# Patient Record
Sex: FEMALE | Race: WHITE | NOT HISPANIC OR LATINO
[De-identification: names, ages, dates, MRNs, and addresses within clinical notes are randomized per-mention and may not be internally consistent; named-entity substitution may affect disease eponyms.]

---

## 2018-09-14 PROBLEM — Z00.00 ENCOUNTER FOR PREVENTIVE HEALTH EXAMINATION: Status: ACTIVE | Noted: 2018-09-14

## 2018-10-12 ENCOUNTER — APPOINTMENT (OUTPATIENT)
Dept: ORTHOPEDIC SURGERY | Facility: CLINIC | Age: 38
End: 2018-10-12
Payer: MEDICAID

## 2018-10-12 VITALS
DIASTOLIC BLOOD PRESSURE: 74 MMHG | SYSTOLIC BLOOD PRESSURE: 121 MMHG | OXYGEN SATURATION: 98 % | HEART RATE: 81 BPM | HEIGHT: 65 IN | BODY MASS INDEX: 17.99 KG/M2 | WEIGHT: 108 LBS

## 2018-10-12 DIAGNOSIS — M22.2X2 PATELLOFEMORAL DISORDERS, LEFT KNEE: ICD-10-CM

## 2018-10-12 DIAGNOSIS — M76.32 ILIOTIBIAL BAND SYNDROME, LEFT LEG: ICD-10-CM

## 2018-10-12 PROCEDURE — 99203 OFFICE O/P NEW LOW 30 MIN: CPT

## 2019-06-20 ENCOUNTER — APPOINTMENT (OUTPATIENT)
Dept: ENDOCRINOLOGY | Facility: CLINIC | Age: 39
End: 2019-06-20
Payer: MEDICAID

## 2019-06-20 VITALS
DIASTOLIC BLOOD PRESSURE: 80 MMHG | HEART RATE: 78 BPM | BODY MASS INDEX: 19.49 KG/M2 | WEIGHT: 117 LBS | SYSTOLIC BLOOD PRESSURE: 122 MMHG | HEIGHT: 65 IN

## 2019-06-20 DIAGNOSIS — Z82.62 FAMILY HISTORY OF OSTEOPOROSIS: ICD-10-CM

## 2019-06-20 PROCEDURE — 99204 OFFICE O/P NEW MOD 45 MIN: CPT | Mod: 25

## 2019-06-20 PROCEDURE — 36415 COLL VENOUS BLD VENIPUNCTURE: CPT

## 2019-06-21 ENCOUNTER — TRANSCRIPTION ENCOUNTER (OUTPATIENT)
Age: 39
End: 2019-06-21

## 2019-07-01 ENCOUNTER — INBOUND DOCUMENT (OUTPATIENT)
Age: 39
End: 2019-07-01

## 2019-07-02 ENCOUNTER — TRANSCRIPTION ENCOUNTER (OUTPATIENT)
Age: 39
End: 2019-07-02

## 2019-07-02 LAB
ALBUMIN SERPL ELPH-MCNC: 4.3 G/DL
ALP BLD-CCNC: 44 U/L
ALT SERPL-CCNC: 16 U/L
ANION GAP SERPL CALC-SCNC: 12 MMOL/L
AST SERPL-CCNC: 18 U/L
BASOPHILS # BLD AUTO: 0.02 K/UL
BASOPHILS NFR BLD AUTO: 0.3 %
BILIRUB SERPL-MCNC: 0.2 MG/DL
BUN SERPL-MCNC: 13 MG/DL
CALCIUM SERPL-MCNC: 9.4 MG/DL
CHLORIDE SERPL-SCNC: 103 MMOL/L
CO2 SERPL-SCNC: 27 MMOL/L
CREAT SERPL-MCNC: 1.09 MG/DL
DHEA-SULFATE, SERUM: 170 UG/DL
EOSINOPHIL # BLD AUTO: 0.14 K/UL
EOSINOPHIL NFR BLD AUTO: 1.9 %
GLUCOSE SERPL-MCNC: 65 MG/DL
HCT VFR BLD CALC: 42.8 %
HGB BLD-MCNC: 13.7 G/DL
IMM GRANULOCYTES NFR BLD AUTO: 0.3 %
IRON SATN MFR SERPL: 30 %
IRON SERPL-MCNC: 113 UG/DL
LYMPHOCYTES # BLD AUTO: 1.53 K/UL
LYMPHOCYTES NFR BLD AUTO: 21.1 %
MAN DIFF?: NORMAL
MCHC RBC-ENTMCNC: 32 GM/DL
MCHC RBC-ENTMCNC: 32.3 PG
MCV RBC AUTO: 100.9 FL
MONOCYTES # BLD AUTO: 0.67 K/UL
MONOCYTES NFR BLD AUTO: 9.3 %
NEUTROPHILS # BLD AUTO: 4.86 K/UL
NEUTROPHILS NFR BLD AUTO: 67.1 %
PLATELET # BLD AUTO: 355 K/UL
POTASSIUM SERPL-SCNC: 4.5 MMOL/L
PROT SERPL-MCNC: 6.6 G/DL
RBC # BLD: 4.24 M/UL
RBC # FLD: 12.9 %
SODIUM SERPL-SCNC: 142 MMOL/L
T4 FREE SERPL-MCNC: 1.3 NG/DL
TESTOST SERPL-MCNC: 13.2 NG/DL
TIBC SERPL-MCNC: 381 UG/DL
TSH SERPL-ACNC: 1.55 UIU/ML
UIBC SERPL-MCNC: 268 UG/DL
WBC # FLD AUTO: 7.24 K/UL

## 2019-07-02 NOTE — ASSESSMENT
[FreeTextEntry1] : Hair loss/elevated testosterone. She has no evidence of virilization. We will perform a laboratory evaluation as below. We discussed switching her combined oral contraceptive pill to a preparation with norethindrone. We discussed another trial of topical minoxidil. We discussed the limited data for biotin or Nutrafol supplements. We can consider an increase in spironolactone to 100 mg twice daily next visit as needed.\par \par Return to clinic in 6 months or earlier as needed.

## 2019-07-02 NOTE — HISTORY OF PRESENT ILLNESS
[FreeTextEntry1] : Ms. Humphrey is a 39 year-old woman with a history of hair loss, elevated testosterone, mental health issues presenting to establish care with me for her endocrine issues.\par \par Hair loss/elevated testosterone.\par She has a history of hair loss over the past two years. Her testosterone was noted to be elevated around that time.\par She was started on spironolactone in June 2018. Her dose was originally 100 mg twice daily, however, she had very heavy menstrual bleeding and has been cutting her pills in half for a dose of 50 mg twice daily.\par She was started on ethinyl estradiol and levonorgestrel (Lutera) in December 2018.\par She tried topical minoxidil for about a month but had issues with taking it regularly.\par She was previously on lithium, but off for about six months. \par She was having regular menstrual periods before starting a combined oral contraceptive pill. Review of systems otherwise negative in detail.

## 2019-07-02 NOTE — ADDENDUM
[FreeTextEntry1] : Recent test results as below. Iron studies, thyroid function, testosterone, DHEAS are within range. Glucose likely low due to prolonged processing time. Borderline high mean cell volume and can consider vitamin B12/folate testing. Will send a letter to Ms. Humphrey with results. 7/02/19

## 2019-07-02 NOTE — PHYSICAL EXAM
[Alert] : alert [No Acute Distress] : no acute distress [Healthy Appearance] : healthy appearance [No Neck Mass] : no neck mass was observed [Normal Oropharynx] : the oropharynx was normal [Normal Sclera/Conjunctiva] : normal sclera/conjunctiva [No LAD] : no lymphadenopathy [Supple] : the neck was supple [Thyroid Not Enlarged] : the thyroid was not enlarged [No Thyroid Nodules] : there were no palpable thyroid nodules [Normal Rate and Effort] : normal respiratory rhythm and effort [Normal S1, S2] : normal S1 and S2 [Clear to Auscultation] : lungs were clear to auscultation bilaterally [Normal Rate] : heart rate was normal  [No Stigmata of Cushings Syndrome] : no stigmata of cushings syndrome [Regular Rhythm] : with a regular rhythm [Normal Gait] : normal gait [Normal Insight/Judgement] : insight and judgment were intact [Kyphosis] : no kyphosis present [de-identified] : no moon facies, no supraclavicular fat pads [Acanthosis Nigricans] : no acanthosis nigricans [de-identified] : + hair loss in an androgenetic pattern, - pull test

## 2019-08-05 ENCOUNTER — TRANSCRIPTION ENCOUNTER (OUTPATIENT)
Age: 39
End: 2019-08-05

## 2019-08-05 ENCOUNTER — RX RENEWAL (OUTPATIENT)
Age: 39
End: 2019-08-05

## 2019-09-17 ENCOUNTER — TRANSCRIPTION ENCOUNTER (OUTPATIENT)
Age: 39
End: 2019-09-17

## 2019-11-11 ENCOUNTER — TRANSCRIPTION ENCOUNTER (OUTPATIENT)
Age: 39
End: 2019-11-11

## 2020-04-16 ENCOUNTER — TRANSCRIPTION ENCOUNTER (OUTPATIENT)
Age: 40
End: 2020-04-16

## 2020-04-16 RX ORDER — NORETHINDRONE ACETATE AND ETHINYL ESTRADIOL, AND FERROUS FUMARATE 1MG-20(24)
1-20 KIT ORAL
Qty: 3 | Refills: 3 | Status: DISCONTINUED | COMMUNITY
Start: 2019-06-20 | End: 2020-04-16

## 2020-09-10 ENCOUNTER — RX RENEWAL (OUTPATIENT)
Age: 40
End: 2020-09-10

## 2020-10-01 ENCOUNTER — TRANSCRIPTION ENCOUNTER (OUTPATIENT)
Age: 40
End: 2020-10-01

## 2020-10-02 RX ORDER — NORETHINDRONE ACETATE AND ETHINYL ESTRADIOL AND FERROUS FUMARATE 1MG-20(21)
1-20 KIT ORAL DAILY
Qty: 3 | Refills: 0 | Status: DISCONTINUED | COMMUNITY
Start: 2020-04-16 | End: 2020-10-02

## 2020-10-05 ENCOUNTER — TRANSCRIPTION ENCOUNTER (OUTPATIENT)
Age: 40
End: 2020-10-05

## 2020-11-11 ENCOUNTER — APPOINTMENT (OUTPATIENT)
Dept: ENDOCRINOLOGY | Facility: CLINIC | Age: 40
End: 2020-11-11
Payer: COMMERCIAL

## 2020-11-11 PROCEDURE — 99214 OFFICE O/P EST MOD 30 MIN: CPT | Mod: 95

## 2020-11-11 RX ORDER — MULTIVITAMIN
TABLET ORAL
Refills: 0 | Status: ACTIVE | COMMUNITY

## 2020-11-11 RX ORDER — LAMOTRIGINE 200 MG/1
200 TABLET ORAL
Refills: 0 | Status: ACTIVE | COMMUNITY

## 2020-11-11 RX ORDER — LAMOTRIGINE 100 MG/1
100 TABLET ORAL
Refills: 0 | Status: DISCONTINUED | COMMUNITY
End: 2020-11-11

## 2020-11-11 RX ORDER — LORATADINE 10 MG/1
10 TABLET ORAL
Refills: 0 | Status: DISCONTINUED | COMMUNITY
End: 2020-11-11

## 2020-11-11 RX ORDER — CHROMIUM 200 MCG
TABLET ORAL
Refills: 0 | Status: ACTIVE | COMMUNITY

## 2020-11-11 NOTE — HISTORY OF PRESENT ILLNESS
[Home] : at home, [unfilled] , at the time of the visit. [Medical Office: ([facility])___] : at the medical office located in  [Verbal consent obtained from patient] : the patient, [unfilled] [FreeTextEntry1] : Ms. Humphrey is a 40 year-old woman with a history of hair loss, elevated testosterone, mental health issues presenting for follow-up of hair loss and elevated testosterone. I saw her for an initial visit in June 2019.\par \par Hair loss. Elevated testosterone.\par She has a history of hair loss since around 2017. Her testosterone was noted to be elevated around that time.\par She was started on spironolactone in June 2018. Her dose was originally 100 mg twice daily, however, she had very heavy menstrual bleeding and had been cutting her pills in half for a dose of 50 mg twice daily. Most recently she has been taking 50 mg twice daily. \par She was started on Lutera (ethinyl estradiol and levonorgestrel) in December 2018, adjusted to Junel Fe (ethinyl estradiol and norethindrone acetate) in June 2019.\par She tried topical minoxidil for about a month but had issues with taking it regularly.\par She was having regular menstrual periods before starting a combined oral contraceptive pill. Review of systems otherwise negative in detail.\par \par Interim History \par Last visit we adjusted her combined oral contraceptive pill to Junel Fe. She has had minimal bleeding. \par She has been taking spironolactone 50 mg daily instead of twice daily due to sleep disturbance, but believes she could take twice daily now.\par No recent laboratory testing.\par She is having ongoing hair loss. \par Medical and surgical history, medications, allergies, social and family history reviewed and updated as needed.

## 2020-11-11 NOTE — PHYSICAL EXAM
[Alert] : alert [Healthy Appearance] : healthy appearance [No Acute Distress] : no acute distress [Normal Insight/Judgement] : insight and judgment were intact [Acne] : no acne [Hirsutism] : no hirsutism

## 2020-11-11 NOTE — ASSESSMENT
[FreeTextEntry1] : Hair loss. Elevated testosterone. We will perform laboratory evaluation as below. She has no evidence of virilization. She is tolerating a combined oral contraceptive pill with norethindrone acetate and we will continue. We discussed another trial of topical minoxidil. We will adjust spironolactone to 50 mg twice daily and can consider further adjustment to 100 mg twice daily next visit as needed. We discussed the limited data for biotin or Nutrafol supplements. \par \par Return to clinic in 6 months or earlier as needed.

## 2021-02-16 ENCOUNTER — RX RENEWAL (OUTPATIENT)
Age: 41
End: 2021-02-16

## 2021-03-11 ENCOUNTER — APPOINTMENT (OUTPATIENT)
Dept: BREAST CENTER | Facility: CLINIC | Age: 41
End: 2021-03-11
Payer: COMMERCIAL

## 2021-03-11 VITALS
WEIGHT: 112.38 LBS | SYSTOLIC BLOOD PRESSURE: 121 MMHG | DIASTOLIC BLOOD PRESSURE: 79 MMHG | HEART RATE: 72 BPM | HEIGHT: 65 IN | BODY MASS INDEX: 18.72 KG/M2

## 2021-03-11 DIAGNOSIS — F31.60 BIPOLAR DISORDER, CURRENT EPISODE MIXED, UNSPECIFIED: ICD-10-CM

## 2021-03-11 PROCEDURE — 99072 ADDL SUPL MATRL&STAF TM PHE: CPT

## 2021-03-11 PROCEDURE — 99205 OFFICE O/P NEW HI 60 MIN: CPT

## 2021-03-11 RX ORDER — NORETHINDRONE ACETATE AND ETHINYL ESTRADIOL AND FERROUS FUMARATE 1MG-20(24)
1-20 KIT ORAL DAILY
Qty: 90 | Refills: 3 | Status: DISCONTINUED | COMMUNITY
Start: 2020-09-10 | End: 2021-03-11

## 2021-03-11 NOTE — PHYSICAL EXAM
[Normocephalic] : normocephalic [Atraumatic] : atraumatic [EOMI] : extra ocular movement intact [Supple] : supple [No Supraclavicular Adenopathy] : no supraclavicular adenopathy [No Thyromegaly] : no thyromegaly [No Edema] : no edema [No Swelling] : no swelling [No Rashes] : no rashes [No Ulceration] : no ulceration [de-identified] : nonlabored respirations  [de-identified] : Right breast 5 cm mobile mass lower outer quadrant, no axillary or supraclavicular adenopathy\par Left breast axilla and supraclavicular area within normal limits

## 2021-03-11 NOTE — REVIEW OF SYSTEMS
[Fever] : no fever [Chills] : no chills [Loss Of Hearing] : no hearing loss [Chest Pain] : no chest pain [Palpitations] : no palpitations [Shortness Of Breath] : no shortness of breath [Cough] : no cough [Abdominal Pain] : no abdominal pain [Vomiting] : no vomiting [Skin Lesions] : no skin lesions [Skin Wound] : no skin wound [Confused] : no confusion [Difficulty Walking] : no difficulty walking [Anxiety] : no anxiety [Depression] : no depression [Swollen Glands In The Neck] : no swollen glands in the neck

## 2021-03-11 NOTE — HISTORY OF PRESENT ILLNESS
[FreeTextEntry1] : 3/11/21 Patient presents to the office for surgical evaluation of a growing benign breast mass previously biopsied in 2017. The patient states the right breast mass has grown over the years and is now "heavy". She also complains of left areolar skin lesions x 1 month ago which was treated with a topical cream by her PCP. Fhx of ovarian cancer in mom, dx 67. \par 9/14/17 R US core bx of 3.1 cm mass 7:00 3FN path-  focal stromal fibrosis, omega clip, concordant \par 1/20/21 B/l MG & US -MG- dense, R- biopsy clip; US- 4.8 cm biopsy proven benign breast mass 7:00 3 FN increased in size since 2017, L- sub cm complicated cyst. BIRADS 4

## 2021-03-11 NOTE — PAST MEDICAL HISTORY
[Menarche Age ____] : age at menarche was [unfilled] [Definite ___ (Date)] : the last menstrual period was [unfilled] [unknown] : the patient is unsure of the date of her LMP [Total Preg ___] : G[unfilled] [FreeTextEntry6] : no [FreeTextEntry7] : yes  [FreeTextEntry8] : no

## 2021-03-23 ENCOUNTER — RESULT REVIEW (OUTPATIENT)
Age: 41
End: 2021-03-23

## 2021-04-01 ENCOUNTER — NON-APPOINTMENT (OUTPATIENT)
Age: 41
End: 2021-04-01

## 2021-06-08 ENCOUNTER — APPOINTMENT (OUTPATIENT)
Dept: BREAST CENTER | Facility: AMBULATORY SURGERY CENTER | Age: 41
End: 2021-06-08

## 2021-07-15 ENCOUNTER — TRANSCRIPTION ENCOUNTER (OUTPATIENT)
Age: 41
End: 2021-07-15

## 2021-07-15 VITALS
SYSTOLIC BLOOD PRESSURE: 112 MMHG | TEMPERATURE: 98 F | OXYGEN SATURATION: 99 % | DIASTOLIC BLOOD PRESSURE: 79 MMHG | HEART RATE: 76 BPM | RESPIRATION RATE: 16 BRPM | HEIGHT: 65 IN | WEIGHT: 106.7 LBS

## 2021-07-15 NOTE — ASU PATIENT PROFILE, ADULT - PMH
Alopecia    Bipolar affective disorder    Fibroadenoma of breast, right     Alopecia    Bipolar affective disorder    Breast lump  RIGHT

## 2021-07-16 ENCOUNTER — RESULT REVIEW (OUTPATIENT)
Age: 41
End: 2021-07-16

## 2021-07-16 ENCOUNTER — OUTPATIENT (OUTPATIENT)
Dept: OUTPATIENT SERVICES | Facility: HOSPITAL | Age: 41
LOS: 1 days | Discharge: HOME | End: 2021-07-16
Payer: COMMERCIAL

## 2021-07-16 ENCOUNTER — APPOINTMENT (OUTPATIENT)
Dept: BREAST CENTER | Facility: HOSPITAL | Age: 41
End: 2021-07-16

## 2021-07-16 VITALS — DIASTOLIC BLOOD PRESSURE: 81 MMHG | SYSTOLIC BLOOD PRESSURE: 135 MMHG | OXYGEN SATURATION: 100 %

## 2021-07-16 PROCEDURE — 19120 REMOVAL OF BREAST LESION: CPT | Mod: RT

## 2021-07-16 PROCEDURE — 88307 TISSUE EXAM BY PATHOLOGIST: CPT

## 2021-07-16 PROCEDURE — 76098 X-RAY EXAM SURGICAL SPECIMEN: CPT | Mod: 26

## 2021-07-16 PROCEDURE — 88307 TISSUE EXAM BY PATHOLOGIST: CPT | Mod: 26

## 2021-07-16 RX ORDER — ACETAMINOPHEN 500 MG
650 TABLET ORAL ONCE
Refills: 0 | Status: DISCONTINUED | OUTPATIENT
Start: 2021-07-16 | End: 2021-07-16

## 2021-07-16 RX ORDER — ONDANSETRON 8 MG/1
4 TABLET, FILM COATED ORAL ONCE
Refills: 0 | Status: DISCONTINUED | OUTPATIENT
Start: 2021-07-16 | End: 2021-07-16

## 2021-07-16 NOTE — BRIEF OPERATIVE NOTE - OPERATION/FINDINGS
Incision was made below the periareolar region and bovie dissection was performed until visualization of the breast mass. Mass was bluntly dissected away from surrounding tissue and removed. Skin was closed with monocryl running suture.

## 2021-07-16 NOTE — PACU DISCHARGE NOTE - COMMENTS
Discharge instructions given, pt. verbalized understanding, v/s stable, pt. to be D/c'd home accompanied by Mother. verbailize no complaints, RT. Breast Biopsy site , no swelling/ no pain no bleeding, noted.

## 2021-07-19 LAB — SURGICAL PATHOLOGY STUDY: SIGNIFICANT CHANGE UP

## 2021-07-21 ENCOUNTER — NON-APPOINTMENT (OUTPATIENT)
Age: 41
End: 2021-07-21

## 2021-08-04 ENCOUNTER — APPOINTMENT (OUTPATIENT)
Dept: BREAST CENTER | Facility: CLINIC | Age: 41
End: 2021-08-04
Payer: COMMERCIAL

## 2021-08-04 VITALS — DIASTOLIC BLOOD PRESSURE: 73 MMHG | SYSTOLIC BLOOD PRESSURE: 103 MMHG

## 2021-08-04 VITALS — BODY MASS INDEX: 18.31 KG/M2 | HEART RATE: 73 BPM | WEIGHT: 110 LBS | OXYGEN SATURATION: 97 %

## 2021-08-04 PROBLEM — L65.9 NONSCARRING HAIR LOSS, UNSPECIFIED: Chronic | Status: ACTIVE | Noted: 2021-07-15

## 2021-08-04 PROBLEM — F31.9 BIPOLAR DISORDER, UNSPECIFIED: Chronic | Status: ACTIVE | Noted: 2021-07-15

## 2021-08-04 PROBLEM — N63.0 UNSPECIFIED LUMP IN UNSPECIFIED BREAST: Chronic | Status: ACTIVE | Noted: 2021-07-15

## 2021-08-04 PROCEDURE — 99024 POSTOP FOLLOW-UP VISIT: CPT

## 2021-08-10 ENCOUNTER — TRANSCRIPTION ENCOUNTER (OUTPATIENT)
Age: 41
End: 2021-08-10

## 2021-11-04 ENCOUNTER — TRANSCRIPTION ENCOUNTER (OUTPATIENT)
Age: 41
End: 2021-11-04

## 2022-01-21 ENCOUNTER — APPOINTMENT (OUTPATIENT)
Dept: ENDOCRINOLOGY | Facility: CLINIC | Age: 42
End: 2022-01-21
Payer: COMMERCIAL

## 2022-01-21 DIAGNOSIS — R73.09 OTHER ABNORMAL GLUCOSE: ICD-10-CM

## 2022-01-21 DIAGNOSIS — R79.89 OTHER SPECIFIED ABNORMAL FINDINGS OF BLOOD CHEMISTRY: ICD-10-CM

## 2022-01-21 DIAGNOSIS — L65.9 NONSCARRING HAIR LOSS, UNSPECIFIED: ICD-10-CM

## 2022-01-21 PROCEDURE — 99214 OFFICE O/P EST MOD 30 MIN: CPT | Mod: 95

## 2022-01-31 PROBLEM — R73.09 ABNORMAL GLUCOSE LEVEL: Status: ACTIVE | Noted: 2022-01-31

## 2022-02-02 ENCOUNTER — TRANSCRIPTION ENCOUNTER (OUTPATIENT)
Age: 42
End: 2022-02-02

## 2022-02-24 ENCOUNTER — APPOINTMENT (OUTPATIENT)
Dept: BREAST CENTER | Facility: CLINIC | Age: 42
End: 2022-02-24
Payer: COMMERCIAL

## 2022-02-24 VITALS
DIASTOLIC BLOOD PRESSURE: 77 MMHG | SYSTOLIC BLOOD PRESSURE: 110 MMHG | HEIGHT: 65 IN | WEIGHT: 115 LBS | BODY MASS INDEX: 19.16 KG/M2 | HEART RATE: 75 BPM

## 2022-02-24 DIAGNOSIS — N63.20 UNSPECIFIED LUMP IN THE LEFT BREAST, UNSPECIFIED QUADRANT: ICD-10-CM

## 2022-02-24 DIAGNOSIS — R92.8 OTHER ABNORMAL AND INCONCLUSIVE FINDINGS ON DIAGNOSTIC IMAGING OF BREAST: ICD-10-CM

## 2022-02-24 DIAGNOSIS — N63.10 UNSPECIFIED LUMP IN THE RIGHT BREAST, UNSPECIFIED QUADRANT: ICD-10-CM

## 2022-02-24 PROCEDURE — 99213 OFFICE O/P EST LOW 20 MIN: CPT

## 2022-02-24 RX ORDER — ARIPIPRAZOLE 5 MG/1
5 TABLET ORAL
Refills: 0 | Status: ACTIVE | COMMUNITY

## 2022-08-15 ENCOUNTER — RX RENEWAL (OUTPATIENT)
Age: 42
End: 2022-08-15

## 2022-08-15 RX ORDER — SPIRONOLACTONE 100 MG/1
100 TABLET ORAL TWICE DAILY
Qty: 180 | Refills: 1 | Status: ACTIVE | COMMUNITY
Start: 2021-02-16 | End: 1900-01-01

## 2022-08-17 ENCOUNTER — TRANSCRIPTION ENCOUNTER (OUTPATIENT)
Age: 42
End: 2022-08-17

## 2022-08-17 NOTE — ADDENDUM
[FreeTextEntry1] : Recent laboratory results as below; discussed with Ms. Humphrey. Glucose borderline elevated for fasting value; she is concerned and we can send a HbA1c level with next blood tests. ALT borderline elevated and recommended follow-up with primary care. Vitamin D elevated and recommended decrease in vitamin D from 5,000 intl units daily to 10,000 intl units weekly. Other test results within range. We can adjust spironolactone to 100 mg twice daily, with monitoring of electrolytes in two weeks. 1/31/22\par \par Recent laboratory results as below. Sodium borderline low and creatinine elevated; recommend hydration. Electrolytes otherwise within range. ALT again borderline elevated and recommend follow-up with primary care. HbA1c within range. I left a telephone message for Ms. Humphrey to discuss. 2/22/22\par \par Ms. Humphrey sent me a Portal message that ALT continues to increase, although only borderline elevated. She prefers to decrease spironolactone to 50 mg twice daily for now; we can consider off-label use of finasteride at her next appointment. 8/17/22\par \par Laboratories (February 18, 2022) reviewed and significant for: \par Sodium 134 mmol/L (normal: 135-146)\par BUN/creatinine 18/1.13 mg/dL (normal: 0.50-1.10) (eGFR 60 mL/min)\par ALT 40 U/L (6-29)\par Magnesium 1.8 mg/dL\par HbA1c 5.0%\par \par Laboratories (January 26, 2022) reviewed and significant for: \par Unremarkable complete blood count\par Glucose 100 mg/dL (normal: 65-99)\par ALT 32 U/L (normal: 6-29); otherwise unremarkable comprehensive metabolic panel\par TSH 2.03 uIU/mL (normal: 0.40-4.50)\par Free T4 1.3 ng/dL (normal: 0.8-1.8)\par Testosterone 25 ng/dL (normal: 2-45)\par Dehydroepiandrosterone sulfate 172 mcg/dL (normal: )\par Vitamin B12 474 pg/mL\par 25-hydroxyvitamin D 108 ng/mL

## 2022-08-17 NOTE — HISTORY OF PRESENT ILLNESS
[Home] : at home, [unfilled] , at the time of the visit. [Medical Office: ([facility])___] : at the medical office located in  [Verbal consent obtained from patient] : the patient, [unfilled] [FreeTextEntry1] : Ms. Humphrey is a 41 year-old woman with a history of hair loss, elevated testosterone, mental health issues presenting for follow-up of hair loss and elevated testosterone. I saw her for an initial visit in June 2019 and last in November 2020.\par \par Hair loss. Elevated testosterone.\par She has a history of hair loss since around 2017. Her testosterone was noted to be elevated around that time.\par She was started on spironolactone in June 2018. Her dose was originally 100 mg twice daily, however, she had very heavy menstrual bleeding and had been cutting her pills in half for a dose of 50 mg twice daily. Most recently she has been taking 50 mg twice daily. \par She was started on Lutera (ethinyl estradiol and levonorgestrel) in December 2018, adjusted to Junel Fe (ethinyl estradiol and norethindrone acetate) in June 2019 and Morena FE (ethinyl estradiol and norethindrone acetate) in March 2021 due to insurance.\par She has had issues with topical minoxidil.\par She was having regular menstrual periods before starting a combined oral contraceptive pill. Review of systems otherwise negative in detail.\par \par Interim History \par She has been taking spironolactone 50 mg twice daily.\par No recent laboratory testing.\par She has had ongoing hair loss; no change from previous. She notes lighter periods/absence of menstrual bleeding.\par Medical and surgical history, medications, allergies, social and family history reviewed and updated as needed.

## 2022-08-17 NOTE — PHYSICAL EXAM
[Alert] : alert [Healthy Appearance] : healthy appearance [No Acute Distress] : no acute distress [Normal Sclera/Conjunctiva] : normal sclera/conjunctiva [Normal Hearing] : hearing was normal [No Respiratory Distress] : no respiratory distress [Normal Insight/Judgement] : insight and judgment were intact [Acne] : no acne [Hirsutism] : no hirsutism

## 2023-01-26 ENCOUNTER — APPOINTMENT (OUTPATIENT)
Dept: BREAST CENTER | Facility: CLINIC | Age: 43
End: 2023-01-26
Payer: COMMERCIAL

## 2023-01-26 VITALS
BODY MASS INDEX: 19.66 KG/M2 | HEIGHT: 65 IN | WEIGHT: 118 LBS | DIASTOLIC BLOOD PRESSURE: 76 MMHG | SYSTOLIC BLOOD PRESSURE: 112 MMHG | HEART RATE: 65 BPM

## 2023-01-26 DIAGNOSIS — Z80.41 FAMILY HISTORY OF MALIGNANT NEOPLASM OF OVARY: ICD-10-CM

## 2023-01-26 DIAGNOSIS — Z78.9 OTHER SPECIFIED HEALTH STATUS: ICD-10-CM

## 2023-01-26 DIAGNOSIS — D24.9 BENIGN NEOPLASM OF UNSPECIFIED BREAST: ICD-10-CM

## 2023-01-26 PROCEDURE — 99213 OFFICE O/P EST LOW 20 MIN: CPT

## 2023-01-26 NOTE — PAST MEDICAL HISTORY
[Menarche Age ____] : age at menarche was [unfilled] [Definite ___ (Date)] : the last menstrual period was [unfilled] [unknown] : the patient is unsure of the date of her LMP [Total Preg ___] : G[unfilled] [FreeTextEntry5] : no [FreeTextEntry6] : no [FreeTextEntry7] : yes [FreeTextEntry8] : no

## 2023-01-26 NOTE — HISTORY OF PRESENT ILLNESS
[FreeTextEntry1] : Patient is a 43yo F here for breast cancer screening. Hx of RIGHT excisional bx on 8/4/21 (age 40) yielding PASH. Hx of left areolar skin lesions x 1 month ago which was treated with a topical cream by her PCP. Fhx of ovarian cancer in mom, dx 67. Patient has previously declined genetic testing. Patient denies palpable masses, skin changes, or nipple discharge bilaterally.\par \par JAMAL Lifetime Risk- 11.8%\par \par 9/14/17 R US core bx of 3.1 cm mass 7:00 3FN path-  focal stromal fibrosis, omega clip, concordant \par 1/20/21 B/l MG & US -MG- dense, R- biopsy clip; US- 4.8 cm biopsy proven benign breast mass 7:00 3 FN increased in size since 2017, L- sub cm complicated cyst. BIRADS 4\par 3/24/21: R US guided core bx for 5.1cm mass- No clip. Benign breast tissue w/ PASH. Benign and concordant.\par 7/16/21: R excisional biopsy- Benign breast tissue w/ PASH1\par 1/24/22: B/l MG & US- heterogenously dense. R focal asymmetry inner 5FN (rec 6m f/u). R 0.7cm hypoechoic lesions periareolar 9:00 (rec 6m f/u). BI-RADS 3\par 5/20/22: R MG & US- heterogenously dense. R stable small asymmetric opacity inner breast. US- R 0.8cm simple cyst 8:00 1FN (palpable). R 0.6cm stable nonspecific hypoechoic nodule 9:00 periareolar. Rec f/u at time of annual imaging. BI-RADS 3\par 1/20/23: B/l MG & US- heterogeneously dense. US- B/l cysts. R 0.6cm solid mass 9:00 periareolar. BI-RADS 2

## 2023-01-26 NOTE — PHYSICAL EXAM
[Normocephalic] : normocephalic [EOMI] : extra ocular movement intact [Supple] : supple [No Supraclavicular Adenopathy] : no supraclavicular adenopathy [No Cervical Adenopathy] : no cervical adenopathy [No Rashes] : no rashes [No Ulceration] : no ulceration [de-identified] : Bilateral breast/axilla/supraclavicular area: No masses, discharge, or adenopathy\par

## 2023-01-26 NOTE — REVIEW OF SYSTEMS
[Fever] : no fever [Chills] : no chills [Shortness Of Breath] : no shortness of breath [Cough] : no cough [Skin Lesions] : no skin lesions [Skin Wound] : no skin wound [Swollen Glands In The Neck] : no swollen glands in the neck

## 2023-01-30 ENCOUNTER — RX RENEWAL (OUTPATIENT)
Age: 43
End: 2023-01-30

## 2023-01-30 RX ORDER — NORETHINDRONE ACETATE/ETHINYL ESTRADIOL AND FERROUS FUMARATE 1MG-20(24)
1-20 KIT ORAL DAILY
Qty: 4 | Refills: 0 | Status: ACTIVE | COMMUNITY
Start: 2023-01-30 | End: 1900-01-01

## 2024-01-08 NOTE — PAST MEDICAL HISTORY
[Menarche Age ____] : age at menarche was [unfilled] [Definite ___ (Date)] : the last menstrual period was [unfilled] [unknown] : the patient is unsure of the date of her LMP [Total Preg ___] : G[unfilled] [FreeTextEntry5] : no [FreeTextEntry6] : no [FreeTextEntry8] : no [FreeTextEntry7] : yes

## 2024-01-08 NOTE — PHYSICAL EXAM
[Normocephalic] : normocephalic [EOMI] : extra ocular movement intact [Supple] : supple [No Supraclavicular Adenopathy] : no supraclavicular adenopathy [No Cervical Adenopathy] : no cervical adenopathy [de-identified] : Bilateral breast/axilla/supraclavicular area: No masses, discharge, or adenopathy\par

## 2024-01-08 NOTE — HISTORY OF PRESENT ILLNESS
[FreeTextEntry1] : Patient is a 42yo F here for breast cancer screening. Hx of RIGHT excisional bx on 8/4/21 (age 40) yielding PASH. Hx of left areolar skin lesions x 1 month ago which was treated with a topical cream by her PCP. Fhx of ovarian cancer in mom, dx 67. Patient has previously declined genetic testing. Patient denies palpable masses, skin changes, or nipple discharge bilaterally.  JAMAL Lifetime Risk- 11.8%  9/14/17 R US core bx of 3.1 cm mass 7:00 3FN path-  focal stromal fibrosis, omega clip, concordant  1/20/21 B/l MG & US -MG- dense, R- biopsy clip; US- 4.8 cm biopsy proven benign breast mass 7:00 3 FN increased in size since 2017, L- sub cm complicated cyst. BIRADS 4 3/24/21: R US guided core bx for 5.1cm mass- No clip. Benign breast tissue w/ PASH. Benign and concordant. 7/16/21: R excisional biopsy- Benign breast tissue w/ PASH1 1/24/22: B/l MG & US- heterogeneously dense. R focal asymmetry inner 5FN (rec 6m f/u). R 0.7cm hypoechoic lesions periareolar 9:00 (rec 6m f/u). BI-RADS 3 5/20/22: R MG & US- heterogeneously dense. R stable small asymmetric opacity inner breast. US- R 0.8cm simple cyst 8:00 1FN (palpable). R 0.6cm stable nonspecific hypoechoic nodule 9:00 periareolar. Rec f/u at time of annual imaging. BI-RADS 3 1/20/23: B/l MG & US- heterogeneously dense. US- B/l cysts. R 0.6cm solid mass 9:00 periareolar. BI-RADS 2 1/29/24: B/l MG & US- to be added on

## 2024-01-29 ENCOUNTER — NON-APPOINTMENT (OUTPATIENT)
Age: 44
End: 2024-01-29

## 2024-01-29 ENCOUNTER — APPOINTMENT (OUTPATIENT)
Dept: BREAST CENTER | Facility: CLINIC | Age: 44
End: 2024-01-29

## 2024-03-21 ENCOUNTER — TRANSCRIPTION ENCOUNTER (OUTPATIENT)
Age: 44
End: 2024-03-21

## 2024-03-22 ENCOUNTER — TRANSCRIPTION ENCOUNTER (OUTPATIENT)
Age: 44
End: 2024-03-22

## 2024-04-10 ENCOUNTER — NON-APPOINTMENT (OUTPATIENT)
Age: 44
End: 2024-04-10

## 2024-04-10 ENCOUNTER — TRANSCRIPTION ENCOUNTER (OUTPATIENT)
Age: 44
End: 2024-04-10